# Patient Record
(demographics unavailable — no encounter records)

---

## 2017-03-13 NOTE — MR
EXAMINATION TYPE: MR lumbar spine wo con

 

DATE OF EXAM: 3/13/2017 8:31 PM

 

COMPARISON: NONE

 

HISTORY: Lumbar radiculopathy per order. Low back pain for 3+ months causing pain into right buttocks
 and thigh.

 

TECHNIQUE: Multiplanar, multisequence imaging of the lumbar spine is performed without IV contrast.

 

FINDINGS: Sagittal images of the lumbar spine show vertebral body heights and alignment to appear sat
isfactory. There is disc desiccation at L4-L5 and L5-S1 levels. There is moderate to advanced disc sp
ace narrowing with vacuum disc phenomenon and mild to moderate anterior spurring with heterogeneous i
ncreased T1 and T2 signal felt to reflect Modic type II degenerative change at L5-S1 level. Small pos
terior disc herniation is present at this level. There is mild disc space narrowing with more promine
nt posterior disc herniation L4-L5 level and some heterogeneous endplate changes along right aspect n
oted.  The conus medullaris is slightly lower in position at L1-L2 disc space level.  No suspicious c
lumping of lumbosacral nerve roots is present. No abnormal signal is noted. 

 

Axial images show the T12-L1, L1-L2, L2-L3, and L3-L4 levels all to appear within normal limits.

 

Axial images at the L4-L5 level show broad-based right paracentral disc protrusion effacing anterior 
thecal sac on axial image 8 measuring roughly 14 mm in transverse diameter. There are mild facet dege
nerative changes seen bilaterally. Bilateral neural foramina remain patent however. There is however 
extension inferiorly believed to be encroaching upon right central L5 nerve on axial image 7 and late
ral recess on axial image 6.

 

Axial images at L5-S1 level show mild/moderate facet degenerative changes bilaterally. There is left 
paracentral disc protrusion minimally effacing anterior thecal sac. There is mild to moderate left-si
ded neural foraminal narrowing due to spur disc seen best on sagittal image 2. Right-sided neural for
amen is patent.

 

Paraspinal muscle bulk is preserved. No suspicious retroperitoneal pathology is present.

 

IMPRESSION: Degenerative changes L4-L5 and L5-S1 level as detailed above with disc herniation L4-L5 l
evel believed to be encroaching on the central right L5 nerve.

## 2017-11-13 NOTE — US
EXAMINATION TYPE: US abdomen complete

 

DATE OF EXAM: 11/13/2017

 

COMPARISON: NONE

 

CLINICAL HISTORY: 39-year-old male R74.8 Abn levels of enzymes. On multiple medications for HTN, ADD,
 and depression; prior left renal stone removed per patient. 

 

TECHNIQUE: Multiple sonographic images of the abdomen are obtained.

 

FINDINGS:

 

Liver Length:  16.7 cm   

Gallbladder Wall:  0.1 cm   

CBD:  0.2 cm

Spleen:  10.8 cm   

Right Kidney:  10.0 x 6.9 x 5.0 cm 

Left Kidney:  10.2 x 5.0 x 5.3 cm   

 

 

 

Pancreas:  Suboptimal visualization secondary to bowel gas and body habitus.

Liver: Slightly heterogeneous echotexture which could be on technical basis. No focal lesion seen.

Gallbladder:  No abnormal gallbladder distention, wall thickening, pericholecystic fluid, or shadowin
g calculi.

**Evidence for sonographic Mondragon's sign:  No

CBD:  wnl 

Spleen:  wnl   

Right Kidney:  No hydronephrosis.

Left Kidney:  No hydronephrosis.

Upper IVC:  wnl  

Abd Aorta:  wnl

 

 

 

 

IMPRESSION: 

1. Slightly heterogeneous appearance to the liver could be on a technical basis or could reflect subt
le changes of nonspecific hepatocellular disease.

2. No cholelithiasis or biliary ductal dilatation.